# Patient Record
Sex: FEMALE | Race: WHITE
[De-identification: names, ages, dates, MRNs, and addresses within clinical notes are randomized per-mention and may not be internally consistent; named-entity substitution may affect disease eponyms.]

---

## 2023-07-07 PROBLEM — Z00.00 ENCOUNTER FOR PREVENTIVE HEALTH EXAMINATION: Status: ACTIVE | Noted: 2023-07-07

## 2023-07-11 ENCOUNTER — NON-APPOINTMENT (OUTPATIENT)
Age: 38
End: 2023-07-11

## 2023-07-11 ENCOUNTER — APPOINTMENT (OUTPATIENT)
Dept: OBGYN | Facility: CLINIC | Age: 38
End: 2023-07-11
Payer: COMMERCIAL

## 2023-07-11 VITALS
WEIGHT: 137 LBS | SYSTOLIC BLOOD PRESSURE: 89 MMHG | OXYGEN SATURATION: 99 % | HEIGHT: 65 IN | HEART RATE: 87 BPM | DIASTOLIC BLOOD PRESSURE: 59 MMHG | BODY MASS INDEX: 22.82 KG/M2

## 2023-07-11 DIAGNOSIS — Z78.9 OTHER SPECIFIED HEALTH STATUS: ICD-10-CM

## 2023-07-11 DIAGNOSIS — O09.522 SUPERVISION OF ELDERLY MULTIGRAVIDA, SECOND TRIMESTER: ICD-10-CM

## 2023-07-11 DIAGNOSIS — F41.9 ANXIETY DISORDER, UNSPECIFIED: ICD-10-CM

## 2023-07-11 DIAGNOSIS — Z87.891 PERSONAL HISTORY OF NICOTINE DEPENDENCE: ICD-10-CM

## 2023-07-11 DIAGNOSIS — Z13.79 ENCOUNTER FOR OTHER SCREENING FOR GENETIC AND CHROMOSOMAL ANOMALIES: ICD-10-CM

## 2023-07-11 PROCEDURE — 36415 COLL VENOUS BLD VENIPUNCTURE: CPT

## 2023-07-11 PROCEDURE — 76830 TRANSVAGINAL US NON-OB: CPT

## 2023-07-11 PROCEDURE — 99203 OFFICE O/P NEW LOW 30 MIN: CPT | Mod: 25

## 2023-07-12 ENCOUNTER — TRANSCRIPTION ENCOUNTER (OUTPATIENT)
Age: 38
End: 2023-07-12

## 2023-07-18 ENCOUNTER — ASOB RESULT (OUTPATIENT)
Age: 38
End: 2023-07-18

## 2023-07-18 ENCOUNTER — APPOINTMENT (OUTPATIENT)
Dept: ANTEPARTUM | Facility: CLINIC | Age: 38
End: 2023-07-18
Payer: COMMERCIAL

## 2023-07-18 PROCEDURE — 76805 OB US >/= 14 WKS SNGL FETUS: CPT

## 2023-07-18 PROCEDURE — 76817 TRANSVAGINAL US OBSTETRIC: CPT

## 2023-07-31 ENCOUNTER — APPOINTMENT (OUTPATIENT)
Dept: OBGYN | Facility: CLINIC | Age: 38
End: 2023-07-31
Payer: COMMERCIAL

## 2023-07-31 VITALS
OXYGEN SATURATION: 99 % | SYSTOLIC BLOOD PRESSURE: 109 MMHG | BODY MASS INDEX: 23.16 KG/M2 | DIASTOLIC BLOOD PRESSURE: 71 MMHG | HEIGHT: 65 IN | HEART RATE: 88 BPM | WEIGHT: 139 LBS

## 2023-07-31 DIAGNOSIS — O23.40 UNSPECIFIED INFECTION OF URINARY TRACT IN PREGNANCY, UNSPECIFIED TRIMESTER: ICD-10-CM

## 2023-07-31 PROCEDURE — 0500F INITIAL PRENATAL CARE VISIT: CPT

## 2023-07-31 PROCEDURE — 81003 URINALYSIS AUTO W/O SCOPE: CPT | Mod: NC,QW

## 2023-08-07 LAB
ABO + RH PNL BLD: NORMAL
AFP MOM: 0.99
AFP VALUE: 39.3 NG/ML
ALBUMIN SERPL ELPH-MCNC: 4.4 G/DL
ALP BLD-CCNC: 45 U/L
ALPHA FETOPROTEIN SERUM COMMENT: NORMAL
ALPHA FETOPROTEIN SERUM INTERPRETATION: NORMAL
ALPHA FETOPROTEIN SERUM RESULTS: NORMAL
ALPHA FETOPROTEIN SERUM TEST RESULTS: NORMAL
ALT SERPL-CCNC: 7 U/L
ANION GAP SERPL CALC-SCNC: 13 MMOL/L
AR GENE MUT ANL BLD/T: NORMAL
AST SERPL-CCNC: 14 U/L
B19V IGG SER QL IA: 2.62 INDEX
B19V IGG+IGM SER-IMP: NORMAL
B19V IGG+IGM SER-IMP: POSITIVE
B19V IGM FLD-ACNC: 0.18 INDEX
B19V IGM SER-ACNC: NEGATIVE
BACTERIA UR CULT: NORMAL
BILIRUB SERPL-MCNC: 0.3 MG/DL
BLD GP AB SCN SERPL QL: NORMAL
BUN SERPL-MCNC: 6 MG/DL
C TRACH RRNA SPEC QL NAA+PROBE: NOT DETECTED
CALCIUM SERPL-MCNC: 9.5 MG/DL
CFTR MUT TESTED BLD/T: NEGATIVE
CHLORIDE SERPL-SCNC: 105 MMOL/L
CMV IGG SERPL QL: 0.75 U/ML
CMV IGG SERPL-IMP: POSITIVE
CMV IGM SERPL QL: 33.3 AU/ML
CMV IGM SERPL QL: ABNORMAL
CO2 SERPL-SCNC: 21 MMOL/L
CREAT SERPL-MCNC: 0.6 MG/DL
EGFR: 119 ML/MIN/1.73M2
ESTIMATED AVERAGE GLUCOSE: 91 MG/DL
FMR1 GENE MUT ANL BLD/T: NORMAL
GESTATIONAL AGE BASED ON: NORMAL
GESTATIONAL AGE ON COLLECTION DATE: 16.6 WEEKS
GLUCOSE SERPL-MCNC: 89 MG/DL
HBA1C MFR BLD HPLC: 4.8 %
HBV SURFACE AG SER QL: NONREACTIVE
HCV AB SER QL: NONREACTIVE
HCV S/CO RATIO: 0.08 S/CO
HGB A MFR BLD: 96.2 %
HGB A2 MFR BLD: 2.9 %
HGB F MFR BLD: 0.9 %
HGB FRACT BLD-IMP: NORMAL
HIV1+2 AB SPEC QL IA.RAPID: NONREACTIVE
INSULIN DEP DIABETES: NO
MATERNAL AGE AT EDD AFP: 36.6 YR
MEV IGG FLD QL IA: 41.5 AU/ML
MEV IGG+IGM SER-IMP: POSITIVE
MULTIPLE GESTATION: NO
MUV AB SER-ACNC: POSITIVE
MUV IGG SER QL IA: 73.3 AU/ML
N GONORRHOEA RRNA SPEC QL NAA+PROBE: NOT DETECTED
OSBR RISK 1 IN: NORMAL
POTASSIUM SERPL-SCNC: 4.8 MMOL/L
PROT SERPL-MCNC: 6.4 G/DL
RACE: NORMAL
RUBV IGG FLD-ACNC: 2.2 INDEX
RUBV IGG SER-IMP: POSITIVE
SODIUM SERPL-SCNC: 139 MMOL/L
SOURCE AMPLIFICATION: NORMAL
T PALLIDUM AB SER QL IA: NEGATIVE
TSH SERPL-ACNC: 1.07 UIU/ML
VZV AB TITR SER: POSITIVE
VZV IGG SER IF-ACNC: 1279 INDEX
WEIGHT AFP: 137 LBS

## 2023-08-08 ENCOUNTER — APPOINTMENT (OUTPATIENT)
Dept: ANTEPARTUM | Facility: CLINIC | Age: 38
End: 2023-08-08

## 2023-08-15 ENCOUNTER — APPOINTMENT (OUTPATIENT)
Dept: ANTEPARTUM | Facility: CLINIC | Age: 38
End: 2023-08-15
Payer: COMMERCIAL

## 2023-08-15 ENCOUNTER — ASOB RESULT (OUTPATIENT)
Age: 38
End: 2023-08-15

## 2023-08-15 PROCEDURE — 76817 TRANSVAGINAL US OBSTETRIC: CPT

## 2023-08-15 PROCEDURE — 76811 OB US DETAILED SNGL FETUS: CPT

## 2023-08-21 ENCOUNTER — NON-APPOINTMENT (OUTPATIENT)
Age: 38
End: 2023-08-21

## 2023-08-21 LAB — CMV IGG AVIDITY SERPL IA-RTO: 0.66

## 2023-08-23 ENCOUNTER — APPOINTMENT (OUTPATIENT)
Dept: OBGYN | Facility: CLINIC | Age: 38
End: 2023-08-23
Payer: COMMERCIAL

## 2023-08-23 VITALS
DIASTOLIC BLOOD PRESSURE: 65 MMHG | HEART RATE: 96 BPM | WEIGHT: 140 LBS | BODY MASS INDEX: 23.3 KG/M2 | OXYGEN SATURATION: 98 % | SYSTOLIC BLOOD PRESSURE: 100 MMHG

## 2023-08-23 DIAGNOSIS — L30.9 DERMATITIS, UNSPECIFIED: ICD-10-CM

## 2023-08-23 PROCEDURE — 81003 URINALYSIS AUTO W/O SCOPE: CPT | Mod: NC,QW

## 2023-08-23 PROCEDURE — 0502F SUBSEQUENT PRENATAL CARE: CPT

## 2023-08-27 PROBLEM — F41.9 ANXIETY: Status: ACTIVE | Noted: 2023-08-23

## 2023-08-27 PROBLEM — Z87.891 FORMER SMOKER: Status: ACTIVE | Noted: 2023-08-27

## 2023-08-27 PROBLEM — O23.40 UTI IN PREGNANCY: Status: ACTIVE | Noted: 2023-07-31

## 2023-08-27 PROBLEM — Z78.9 SOCIAL ALCOHOL USE: Status: ACTIVE | Noted: 2023-08-27

## 2023-08-27 NOTE — PROCEDURE
[Transvaginal OB Sonogram] : Transvaginal OB Sonogram [Intrauterine Pregnancy] : intrauterine pregnancy [Yolk Sac] : yolk sac present [Fetal Heart] : fetal heart present [Transvaginal OB Sonogram WNL] : Transvaginal OB Sonogram WNL [FreeTextEntry1] : TVUS done, measuring appropriately, will leave [facility] 12/22/2023 No adnexal masses FHR normal at 170 bpm.

## 2023-08-27 NOTE — HISTORY OF PRESENT ILLNESS
[Patient reported PAP Smear was normal] : Patient reported PAP Smear was normal [N] : Patient denies prior pregnancies [Normal Amount/Duration] :  normal amount and duration [Currently Active] : currently active [Men] : men [No] : No [PapSmeardate] : 01/2022 [FreeTextEntry1] : 03/17/2023

## 2023-08-27 NOTE — PHYSICAL EXAM
[Chaperone Present] : A chaperone was present in the examining room during all aspects of the physical examination [Appropriately responsive] : appropriately responsive [Alert] : alert [No Acute Distress] : no acute distress [Soft] : soft [Non-tender] : non-tender [Non-distended] : non-distended [No Lesions] : no lesions [No HSM] : No HSM [No Mass] : no mass [Oriented x3] : oriented x3 [Examination Of The Breasts] : a normal appearance [No Masses] : no breast masses were palpable [Labia Majora] : normal [Labia Minora] : normal [Normal] : normal [Uterine Adnexae] : normal [FreeTextEntry5] : No increased work of breathing

## 2023-08-27 NOTE — COUNSELING
[Nutrition/ Exercise/ Weight Management] : nutrition, exercise, weight management [Vitamins/Supplements] : vitamins/supplements [Confidentiality] : confidentiality [Pregnancy Options] : pregnancy options [STD (testing, results, tx)] : STD (testing, results, tx) [Vaccines] : vaccines

## 2023-08-27 NOTE — PLAN
[FreeTextEntry1] : 37 y/o  here to establish OB care. Was getting care in [place] before moving back to [place]. Regular cycles. LMP: 2023 EDC 2023 16w4d by LMP dating. TVUS done today - measuring appropriately will leave EDC as 2023, no adnexal masses seen, normal  bpm. This is a desired pregnancy. Welcome to the practice and what to expect discussed. Group practice model and cross coverage discussed. Invasive vs. non-invasive fetal testing discussed, options discussed. Outside medical records from previous provider reviewed. Diet and exercise in pregnancy discussed. OB panel drawn. MSAFP drawn. Next appointments discussed. She wants to wait until after pregnancy to repeat pap.  Will do cervical length monitoring given h/o LEEP. Referral for u/s at [facility] given for early anatomy and completion anatomy u/s. Return 4 weeks or PRN.  I spent a total of 35 minutes on the date of the encounter in evaluating and treating the patient.  Education provided, questions answered.  Plan of care thoroughly discussed.

## 2023-09-06 ENCOUNTER — APPOINTMENT (OUTPATIENT)
Dept: OBGYN | Facility: CLINIC | Age: 38
End: 2023-09-06
Payer: COMMERCIAL

## 2023-09-06 PROCEDURE — 0502F SUBSEQUENT PRENATAL CARE: CPT

## 2023-09-12 ENCOUNTER — ASOB RESULT (OUTPATIENT)
Age: 38
End: 2023-09-12

## 2023-09-12 ENCOUNTER — APPOINTMENT (OUTPATIENT)
Dept: ANTEPARTUM | Facility: CLINIC | Age: 38
End: 2023-09-12
Payer: COMMERCIAL

## 2023-09-12 LAB
GLUCOSE 1H P 50 G GLC PO SERPL-MCNC: 92 MG/DL
T PALLIDUM AB SER QL IA: NEGATIVE

## 2023-09-12 PROCEDURE — 76816 OB US FOLLOW-UP PER FETUS: CPT

## 2023-09-12 PROCEDURE — 76819 FETAL BIOPHYS PROFIL W/O NST: CPT | Mod: 59

## 2023-09-12 PROCEDURE — 76817 TRANSVAGINAL US OBSTETRIC: CPT

## 2023-09-18 ENCOUNTER — APPOINTMENT (OUTPATIENT)
Dept: OBGYN | Facility: CLINIC | Age: 38
End: 2023-09-18
Payer: COMMERCIAL

## 2023-09-18 VITALS
HEIGHT: 65 IN | WEIGHT: 146 LBS | HEART RATE: 109 BPM | DIASTOLIC BLOOD PRESSURE: 51 MMHG | SYSTOLIC BLOOD PRESSURE: 117 MMHG | BODY MASS INDEX: 24.32 KG/M2 | OXYGEN SATURATION: 98 %

## 2023-09-18 DIAGNOSIS — O09.512 SUPERVISION OF ELDERLY PRIMIGRAVIDA, SECOND TRIMESTER: ICD-10-CM

## 2023-09-18 DIAGNOSIS — R76.8 OTHER SPECIFIED ABNORMAL IMMUNOLOGICAL FINDINGS IN SERUM: ICD-10-CM

## 2023-09-18 DIAGNOSIS — Z13.1 ENCOUNTER FOR SCREENING FOR DIABETES MELLITUS: ICD-10-CM

## 2023-09-18 PROCEDURE — 0502F SUBSEQUENT PRENATAL CARE: CPT

## 2023-09-18 PROCEDURE — 81003 URINALYSIS AUTO W/O SCOPE: CPT | Mod: NC,QW

## 2023-10-15 PROBLEM — Z13.1 SPECIAL SCREENING EXAMINATION FOR DIABETES MELLITUS: Status: ACTIVE | Noted: 2023-09-06

## 2023-10-15 PROBLEM — R76.8 CMV (CYTOMEGALOVIRUS) ANTIBODY POSITIVE: Status: ACTIVE | Noted: 2023-07-31

## 2023-10-15 PROBLEM — O09.512 PRIMIGRAVIDA OF ADVANCED MATERNAL AGE IN SECOND TRIMESTER: Status: ACTIVE | Noted: 2023-07-11

## 2023-10-24 ENCOUNTER — APPOINTMENT (OUTPATIENT)
Dept: ANTEPARTUM | Facility: CLINIC | Age: 38
End: 2023-10-24